# Patient Record
Sex: MALE | Race: WHITE | NOT HISPANIC OR LATINO | URBAN - METROPOLITAN AREA
[De-identification: names, ages, dates, MRNs, and addresses within clinical notes are randomized per-mention and may not be internally consistent; named-entity substitution may affect disease eponyms.]

---
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
## 2023-01-30 NOTE — ED PEDIATRIC NURSE NOTE - DISCHARGE DATE/TIME
22-Nov-2020 10:38 Erythromycin Pregnancy And Lactation Text: This medication is Pregnancy Category B and is considered safe during pregnancy. It is also excreted in breast milk.

## 2023-08-15 ENCOUNTER — OUTPATIENT (OUTPATIENT)
Dept: OUTPATIENT SERVICES | Age: 18
LOS: 1 days | Discharge: HOME | End: 2023-08-15

## 2023-08-16 ENCOUNTER — RESULT REVIEW (OUTPATIENT)
Age: 18
End: 2023-08-16

## 2023-08-16 ENCOUNTER — APPOINTMENT (OUTPATIENT)
Dept: PEDIATRIC HEMATOLOGY/ONCOLOGY | Facility: CLINIC | Age: 18
End: 2023-08-16
Payer: COMMERCIAL

## 2023-08-16 VITALS
OXYGEN SATURATION: 100 % | RESPIRATION RATE: 20 BRPM | TEMPERATURE: 98.24 F | BODY MASS INDEX: 23.69 KG/M2 | WEIGHT: 176.81 LBS | HEIGHT: 72.48 IN | SYSTOLIC BLOOD PRESSURE: 119 MMHG | HEART RATE: 80 BPM | DIASTOLIC BLOOD PRESSURE: 77 MMHG

## 2023-08-16 DIAGNOSIS — Z82.3 FAMILY HISTORY OF STROKE: ICD-10-CM

## 2023-08-16 DIAGNOSIS — Z86.19 PERSONAL HISTORY OF OTHER INFECTIOUS AND PARASITIC DISEASES: ICD-10-CM

## 2023-08-16 DIAGNOSIS — M86.152: ICD-10-CM

## 2023-08-16 DIAGNOSIS — I87.002 POSTTHROMBOTIC SYNDROME W/OUT COMPLICATIONS OF LEFT LOWER EXTREMITY: ICD-10-CM

## 2023-08-16 DIAGNOSIS — A40.0: ICD-10-CM

## 2023-08-16 DIAGNOSIS — Z86.718 PERSONAL HISTORY OF OTHER VENOUS THROMBOSIS AND EMBOLISM: ICD-10-CM

## 2023-08-16 LAB
BASOPHILS # BLD AUTO: 0.11 K/UL — SIGNIFICANT CHANGE UP (ref 0–0.2)
BASOPHILS NFR BLD AUTO: 2.1 % — HIGH (ref 0–2)
D DIMER BLD IA.RAPID-MCNC: <150 NG/ML DDU — SIGNIFICANT CHANGE UP
EOSINOPHIL # BLD AUTO: 0.34 K/UL — SIGNIFICANT CHANGE UP (ref 0–0.5)
EOSINOPHIL NFR BLD AUTO: 6.6 % — HIGH (ref 0–6)
FIBRINOGEN PPP-MCNC: 264 MG/DL — SIGNIFICANT CHANGE UP (ref 200–465)
HCT VFR BLD CALC: 44.6 % — SIGNIFICANT CHANGE UP (ref 39–50)
HGB BLD-MCNC: 15.9 G/DL — SIGNIFICANT CHANGE UP (ref 13–17)
IANC: 2.76 K/UL — SIGNIFICANT CHANGE UP (ref 1.8–7.4)
IMM GRANULOCYTES NFR BLD AUTO: 0.4 % — SIGNIFICANT CHANGE UP (ref 0–0.9)
LYMPHOCYTES # BLD AUTO: 1.4 K/UL — SIGNIFICANT CHANGE UP (ref 1–3.3)
LYMPHOCYTES # BLD AUTO: 27 % — SIGNIFICANT CHANGE UP (ref 13–44)
MCHC RBC-ENTMCNC: 30.7 PG — SIGNIFICANT CHANGE UP (ref 27–34)
MCHC RBC-ENTMCNC: 35.7 GM/DL — SIGNIFICANT CHANGE UP (ref 32–36)
MCV RBC AUTO: 86.1 FL — SIGNIFICANT CHANGE UP (ref 80–100)
MONOCYTES # BLD AUTO: 0.56 K/UL — SIGNIFICANT CHANGE UP (ref 0–0.9)
MONOCYTES NFR BLD AUTO: 10.8 % — SIGNIFICANT CHANGE UP (ref 2–14)
NEUTROPHILS # BLD AUTO: 2.76 K/UL — SIGNIFICANT CHANGE UP (ref 1.8–7.4)
NEUTROPHILS NFR BLD AUTO: 53.1 % — SIGNIFICANT CHANGE UP (ref 43–77)
NRBC # BLD: 0 /100 WBCS — SIGNIFICANT CHANGE UP (ref 0–0)
PLATELET # BLD AUTO: 215 K/UL — SIGNIFICANT CHANGE UP (ref 150–400)
PMV BLD: 9.9 FL — SIGNIFICANT CHANGE UP (ref 7–13)
RBC # BLD: 5.18 M/UL — SIGNIFICANT CHANGE UP (ref 4.2–5.8)
RBC # BLD: 5.18 M/UL — SIGNIFICANT CHANGE UP (ref 4.2–5.8)
RBC # FLD: 11.7 % — SIGNIFICANT CHANGE UP (ref 10.3–14.5)
RETICS #: 67.3 K/UL — SIGNIFICANT CHANGE UP (ref 25–125)
RETICS/RBC NFR: 1.3 % — SIGNIFICANT CHANGE UP (ref 0.5–2.5)
WBC # BLD: 5.19 K/UL — SIGNIFICANT CHANGE UP (ref 3.8–10.5)
WBC # FLD AUTO: 5.19 K/UL — SIGNIFICANT CHANGE UP (ref 3.8–10.5)

## 2023-08-16 PROCEDURE — 99213 OFFICE O/P EST LOW 20 MIN: CPT

## 2023-08-17 DIAGNOSIS — Z86.19 PERSONAL HISTORY OF OTHER INFECTIOUS AND PARASITIC DISEASES: ICD-10-CM

## 2023-08-17 DIAGNOSIS — I82.409 ACUTE EMBOLISM AND THROMBOSIS OF UNSPECIFIED DEEP VEINS OF UNSPECIFIED LOWER EXTREMITY: ICD-10-CM

## 2023-08-17 DIAGNOSIS — D68.9 COAGULATION DEFECT, UNSPECIFIED: ICD-10-CM

## 2023-08-17 DIAGNOSIS — Z82.3 FAMILY HISTORY OF STROKE: ICD-10-CM

## 2023-08-17 DIAGNOSIS — Z86.718 PERSONAL HISTORY OF OTHER VENOUS THROMBOSIS AND EMBOLISM: ICD-10-CM

## 2023-08-17 DIAGNOSIS — I82.402 ACUTE EMBOLISM AND THROMBOSIS OF UNSPECIFIED DEEP VEINS OF LEFT LOWER EXTREMITY: ICD-10-CM

## 2023-08-17 DIAGNOSIS — Z87.39 PERSONAL HISTORY OF OTHER DISEASES OF THE MUSCULOSKELETAL SYSTEM AND CONNECTIVE TISSUE: ICD-10-CM

## 2023-08-18 PROBLEM — Z86.718 HISTORY OF DEEP VEIN THROMBOSIS: Status: RESOLVED | Noted: 2017-08-02 | Resolved: 2023-08-18

## 2023-08-18 PROBLEM — I87.002 POST-THROMBOTIC SYNDROME OF LEFT LOWER EXTREMITY: Status: ACTIVE | Noted: 2020-11-20

## 2023-08-18 NOTE — DISCUSSION/SUMMARY
[FreeTextEntry1] : Telephone parent as message left for call back with labs and medication concerns.  PT/INR completed today INR 1.3 L (target 2-3); AntiXa 0.67. Taking 40mg weekly of Warfain and Enoxaparin 70mg twice daily subcutaneous. Needs refills for injectables--Renewed. Advised to call office [phone] if prior authorization is required or any pharmaceutical questions arise.  Warfarin increased --boost 15mg tonight only then give 10mg daily for 3 days and 5 mg daily for 4 days--total 50mg weekly. Repeat INR on 5/25/16 11am scheduled labs only to include CBCDretic, CRP, CMP, ESR. Informed of change in antibiotic form to oral administration of Cephalexin 1500mg every 8 hrs--reminded to administer Warfarin on empty stomach to minimize drug interaction with meds/food.  Report any unusual bleeding/bruising immediately.\par

## 2023-08-18 NOTE — REVIEW OF SYSTEMS
[Anxiety] : anxiety [Negative] : Allergic/Immunologic [Immunizations are up to date by report] : Immunizations are up to date by report [Ecchymoses] : no ecchymoses [Epistaxis] : no epistaxis [Pallor] : no pallor [Anemia] : no anemia [Dyspnea] : no dyspnea [Wheezing] : no wheezing [Joint Swelling] : no joint swelling [Joint Stiffness] : no joint stiffness [Myalgia] : no myalgia [Bone Pain] : no bone pain [FreeTextEntry1] : H/O TSS and osteomyelitis [de-identified] : history of left leg swelling and pain resolving

## 2023-08-18 NOTE — CONSULT LETTER
[Dear  ___] : Dear  [unfilled], [Consult Letter:] : I had the pleasure of evaluating your patient, [unfilled]. [Please see my note below.] : Please see my note below. [Consult Closing:] : Thank you very much for allowing me to participate in the care of this patient.  If you have any questions, please do not hesitate to contact me. [Sincerely,] : Sincerely, [___] : [unfilled] [FreeTextEntry2] : Monie Henson MD\par  [address] [facility]\[place] [address]\par  [phone] [FreeTextEntry3] : MELANIE Head\par  Pediatric Nurse Practitioner\par  Pediatric Hematology Oncology

## 2023-08-18 NOTE — RESULTS/DATA
[FreeTextEntry1] : EXAM:  US DPLX LWR EXT VEINS LTD LT  \par  \par  \par  PROCEDURE DATE:  Nov 26 2017 \par  \par  \par  \par  INTERPRETATION:  CLINICAL INFORMATION: Left leg swelling and pain.\par  \par  COMPARISON: None available.\par  \par  TECHNIQUE: Duplex sonography of the LEFT LOWER extremity with color and \par  spectral Doppler, with and without compression.  \par  \par  FINDINGS:\par  \par  There is normal compressibility of the left common femoral and proximal \par  and mid femoral vein. There is thrombus from the left distal femoral vein \par  down to level of the calf veins (peroneal, posterior tibial and \par  gastrocnemius veins), consistent with an acute DVT.\par  \par  The contralateral common femoral vein is patent.\par  \par  Doppler examination shows normal spontaneous and phasic flow in the left \par  common femoral and proximal and mid femoral vein.\par  \par  IMPRESSION: \par  \par  Acute DVT from the calf veins through distal femoral vein, below and \par  above the knee.\par  \par  The findings were discussed with Dr. Barrera at 8:10 pm on 11/26/17 with [facility].\par  \par  \par  \par  \par  \par  \par  ALICIA BELTRAN M.D., RADIOLOGY RESIDENT\par  This document has been electronically signed.\par  DANIELLE GRANGER M.D, ATTENDING RADIOLOGIST\par  This document has been electronically signed. Nov 26 2017  8:11PM\par  \par  \par  \par  HPI:\par  Patient is a 11 yo male with PMHx significant for DVT and sepsis (4/2016), that presents today with left leg pain x1 week. 7 days prior to admission, patient states that he began to notice pain in the back of his right thigh and calf while playing basketball in gym class. The pain resolved without any medication and then returned 3 days PTA and has persisted every day up until admission. The pain became constant and was described as a "pulsating" pain. Worse with walking and improved with rest. Pain was rated as 5-6/10 when walking. On morning of admission, mom noted that the left leg appeared more swollen than the right leg prompting a call to his PMD who recommended the patient come to the ED. Patient denies recent fevers, chest pain, shortness of breath, pleuritic chest pain, rhinorrhea, abdominal pain, nausea, vomiting or diarrhea. No recent travel. Not a smoker. \par  Of note, patient was admitted from 4/2/16-4/19/16 requiring a PICU stay of 9 days for GAS bacteremia, osteomyelitis and a DVT of  left lower limb external iliac , common femoral, popliteal veins. Patient has been following with hematology (Dr. French). He was initially started on lovenox then switched to Coumadin. Anticoagulation discontinued on 10/2016. Mother denies family history of bleeding disorders. \par  ED Course: Patient had xrays of left tibia, knee and femur as well as a left lower extremity venous Doppler. CBC, Coags and T&S were sent. Labwork and xrays were unremarkable. Ultrasound revealed a thrombus from the left distal femoral vein down to level of the calf veins (peroneal, posterior tibial and gastrocnemius veins), consistent with an acute DVT. Hematology consulted and patient received a loading dose of heparin, was started on a  heparin drip and sent up to the floor. Blood culture also sent given patient's history of bacteremia and mom's concern for a similar episode. (27 Nov 2017 03:51)\par  \par  \par  PAST MEDICAL & SURGICAL HISTORY:\par  Acute deep vein thrombosis (DVT) of femoral vein of left lower extremity\par  No significant past surgical history\par  \par  \par  \par  SOCIAL HISTORY:\par  Lives with mother and 9yo brother\par  Non-smoker\par  Takes Vitamin C occasionally\par  \par  FAMILY HISTORY:\par  Mother had 1 miscarriage\par  Maternal grandfather with stroke at age 66\par  \par  Allergies\par  \par  No Known Allergies\par  \par  Intolerances\par  \par  \par  MEDICATIONS  (STANDING):\par  heparin   Infusion -  Peds 15.2 Unit(s)/kG/Hr (12.084 mL/Hr) IV Continuous <Continuous>\par  heparin IV Intermittent (Loading Dose) - Peds 4000 Unit(s) IV Bolus once\par  \par  MEDICATIONS  (PRN):\par  \par  \par  REVIEW OF SYSTEMS\par  All review of systems negative, except for those marked:\par  Constitutional		Denies  fever, chills, fatigue\par  Skin			Denies rash, petechiae, \par  Eyes			Normal (no vision changes, photophobia)\par  ENT			Normal (no ear pain, discharge)\par  Hematology		DVT LLE\par  Respiratory		Denies dyspnea, shortness of breath\par  Cardiovascular		Normal (no murmur, chest pain/pressure)\par  Gastrointestinal		Normal (no abdominal pain, nausea, emesis)\par  Musculoskeletal		Swollen L calf, no pain at this time, + parasthesias\par  Endocrine		Normal (no polydipsia, polyuria)\par  Neurologic		Denies headache\par  \par  \par  \par  Daily Height/Length in cm: 182.5 (27 Nov 2017 01:14)  \par  Daily Weight in Gm: 21871 (27 Nov 2017 01:14)\par  Vital Signs Last 24 Hrs\par  T(C): 36.7 (27 Nov 2017 09:35), Max: 37.4 (26 Nov 2017 22:26)\par  T(F): 98 (27 Nov 2017 09:35), Max: 99.3 (26 Nov 2017 22:26)\par  HR: 72 (27 Nov 2017 09:35) (62 - 89)\par  BP: 121/60 (27 Nov 2017 09:35) (109/57 - 121/60)\par  BP(mean): --\par  RR: 20 (27 Nov 2017 09:35) (16 - 20)\par  SpO2: 100% (27 Nov 2017 09:35) (97% - 100%)\par  Pain Score:     , Scale:\par  Lansky/Karnofsky Score:\par  \par  PHYSICAL EXAM\par  All physical exam findings normal, except those marked:\par  Constitutional:	Normal: well appearing, in no apparent distress\par  Eyes		Normal: no conjunctival injection, symmetric gaze\par  ENT:		Normal: mucus membranes moist, no mouth sores or mucosal bleeding, normal .\par  .		dentition, symmetric facies.\par  Neck		Normal: no thyromegaly or masses appreciated\par  Cardiovascular	Normal: regular rate, normal S1, S2, no murmurs, rubs or gallops\par  Respiratory	Normal: clear to auscultation bilaterally, no wheezing\par  Abdominal	Normal: normoactive bowel sounds, soft, NT, no hepatosplenomegaly, no \par  Lymphatic	Normal: no adenopathy appreciated\par  Extremities	L calf swollen without erythema, no TTP, pedal pulses brisk b/l\par  Skin		Pen marks to calfs b/l\par  Neurologic	Normal: no focal deficits, \par  Psychiatric	Normal: affect appropriate\par  Musculoskeletal		Normal: full range of motion and no deformities \par  \par  \par  Lab Results\par  \par                                          16.6                  Neurophils% (auto):   64.4 \par  (11-26 @ 19:00):    9.64 )-----------(187          Lymphocytes% (auto):  21.3 \par                                         47.7                   Eosinphils% (auto):   4.7  \par  \par  Manual%: Neutrophils x    ; Lymphocytes x    ; Eosinophils x    ; Bands%: x    ; Blasts x    \par  \par  \par  .		Differential:	[] Automated		[] Manual\par  \par  \par  \par  \par  PT/INR - ( 26 Nov 2017 18:56 )   PT: 11.4 SEC;   INR: 1.02   \par  \par  PTT - ( 27 Nov 2017 08:35 )  PTT:48.0 SEC\par  \par  \par  IMAGING STUDIES:\par  \par  \par  EXAM:  US DPLX LWR EXT VEINS LTD LT  \par  \par  \par  PROCEDURE DATE:  Nov 26 2017 \par  \par  \par  \par  INTERPRETATION:  CLINICAL INFORMATION: Left leg swelling and pain.\par  \par  COMPARISON: None available.\par  \par  TECHNIQUE: Duplex sonography of the LEFT LOWER extremity with color and \par  spectral Doppler, with and without compression.  \par  \par  FINDINGS:\par  \par  There is normal compressibility of the left common femoral and proximal \par  and mid femoral vein. There is thrombus from the left distal femoral vein \par  down to level of the calf veins (peroneal, posterior tibial and \par  gastrocnemius veins), consistent with an acute DVT.\par  \par  The contralateral common femoral vein is patent.\par  \par  Doppler examination shows normal spontaneous and phasic flow in the left \par  common femoral and proximal and mid femoral vein.\par  \par  IMPRESSION: \par  \par  Acute DVT from the calf veins through distal femoral vein, below and \par  above the knee.\par  \par  The findings were discussed with Dr. Barrera at 8:10 pm on 11/26/17 with [facility].  \par  \par  \par  \par  \par  Assessment and Recommendation: \par   Assessment	\par  Nelson is a 12 year old male who presents with a recurrence of L extremity DVT. He has a history in April 2016 of GAS bacteremia and L distal femoral osteomyelitis complicated by a DVT of the left lower limb external iliac, common femoral and popliteal veins. He completed 3 months of Lovenox followed by 3 months of coumadin (switched over per patient preference). He did have a history of a positive lupus anticoagulant that resolved.\par  \par  Patient has had pain in his L leg 7 days prior to admission which he attributed to basketball. He denies any trauma, had traveled by car for 2.5 hours about 2 weeks ago to go hiking, but has not been on any prolonged trips or plane rides. Mother has a history of 1 miscarriage and maternal grandfather had a stroke at 66 years of age.\par  \par  It is currently unclear the etiology of this ipsilateral L leg DVT, but a repeat lupus anticoagulant may be prudent due to his previous history. Dehydration in the setting of playing basketball could have been an inciting factor.\par  \par  Patient was loaded with heparin 75 units/kg last night and started on an infusion at 13.8 units/kg (unclear reason for this infusion rate as the general maintenance dose is 20 units/kg after a loading dose). Patient has been subtherapeutic and adjustments should be made per the chart below.\par  \par  Discussed with IR possibility of site-directed tPA but as the clot is not extensive and is not extending in the iliac, there is no current role for therapeutic intervention per IR\par  \par  \par  \par  UNFRACTIONATED HEPARIN THERAPY\par  \par  APTT (sec)	Bolus (U/kg)	Hold (min)	Rate change (%)	repeat aPTT (hr)\par  < 50	50	0	+ 10	4\par  50-59	0	0	+ 10	4\par  60-85	0	0	0	next day\par  86-95	0	0	- 10	4\par  	0	30	- 10	4\par  > 120	0	60	- 15	4\par  \par  Goal PTT 60-85\par  \par  \par  \par  \par  \par  \par  LOVENOX-ADJUSTMENT\par  \par  \par  Anti-FXa level	Hold next dose?	Dose change?	Repeat anti-FXa level?\par  < 0.35 units/ml	No	Increase by 25%	3-4 hours post 3 doses\par  0.35 - 0.49 units/ml	No	Increase by 10%	3-4 hours post 3 doses\par  0.5 - 1.0 units/ml	No	No	Weekly, 3-4 hours post dose\par  1.1 - 1.5 units/ml	No	Decrease by 20%	3-4 hours post 3 doses\par  1.6 - 2.0 units/ml	No	Decrease by 30%	3-4 hours post 3 doses\par  \par  > 2.0	For these patients, all further doses should be held.  The anti-Xa level is measured every 12 hours until it is < 0.5 units/ml.  LMWH can then be started at a dose 40% less than was originally prescribed   \par  \par  \par  \par  \par  IM injections and arterial sticks are to be AVOIDED\par  Do NOT administer aspirin or NSAIDs\par  NO contact sports\par  When on warfarin: check medication interactions prior to introducing new medications. NO leafy green vegetables\par  \par  \par   Problem/Recommendation - 1:\par  Problem: Acute deep vein thrombosis (DVT) of femoral vein of left lower extremity. Recommendation: - Repeat Doppler (discuss with vascular surgery)\par  \par  - Continue to adjust heparin drip per chart above\par    -- Next PPT due at 3pm, please also obtain a BMP\par  \par  - At 8pm: discontinue heparin drip and administer Lovenox 80 mg/kg (1 mg/kg) SQ q12h\par    -- Check anti-Xa LMWH (not unfractionated) 3-4 hours after 3rd SQ dose *timing critical* - adjust per chart above\par  \par  - Begin warfarin 10mg PO once tonight (further doses to be written based on daily INR)\par  \par  - Obtain lupus anticoagulant panel with next blood draw\par  - Daily AM PT/PTT and CBC/d\par  - L calf measurements daily.\par  \par  Attending Attestation: \par  12 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 In 2016, he developed a DVT in the setting of sepsis. He was treated for 6 months with anticoagulation and repeat Doppler in October 2016 showed compete resolution of the clot. His hypercoagulable work-up was negative. This time he presents with an unprovoked DVT. The etiology is not clear at this point. Both Nelson and his mother state that they would prefer treatment with Coumadin. Will switch to Lovenox today and start Coumadin. \par  \par  I was physically present for the key portions of the evaluation and management (E/M) service provided.  I agree with the above history, physical, and plan which I have reviewed and edited where appropriate.\par  \par  \par  Electronic Signatures:\par  Romina Fountain)  (Signed 28-Nov-2017 13:02)\par  	Authored: Consult Note, Referral/Consultation, Subjective and Objective, Assessment and Recommendation\par  Ilana Carrasquillo)  (Signed 27-Nov-2017 15:53)\par  	Authored: Attending Attestation\par  	Co-Signer: Consult Note, Referral/Consultation, Subjective and Objective, Assessment and Recommendation\par  \par  \par  Last Updated: 28-Nov-2017 13:02 by Romina Fountain)\par  \par  \par  \par  \par  EXAM:  MR LWR EXT NON JOINT WAWIC LT  \par  \par  \par  PROCEDURE DATE:  Nov 30 2017 \par  \par  \par  \par  INTERPRETATION:  Indication: History of recurrent lower leg extremity \par  venous thrombi on anticoagulation therapy, evaluate lower extremity \par  venous system\par  \par  7.5 is of Gadavist was injected intravenously and imaging of the pelvis \par  and upper lower extremities was performed. The aorta, iliac arteries and \par  superficial femoral arteries appear within normal limits bilaterally. Due \par  to technical issues, the venous system was not imaged on this study. \par  Repeat examination for evaluation of the venous system is therefore \par  recommended.\par  \par  Impression: Nondiagnostic study of the venous system. The arterial system \par  is within normal limits.\par  \par  JUDITH CHAPPELL M.D., ATTENDING RADIOLOGIST\par  This document has been electronically signed. Nov 30 2017  3:58PM\par    \par  \par  \par    \par  \par    \par  \par

## 2023-08-18 NOTE — HISTORY OF PRESENT ILLNESS
[de-identified] : 12/5/17 Interval follow up s/p recent hospitalization for second new DVT of left lower extremity. Lovenox 80mg subcutaneous every 12 hrs; no missed doses; last dose 7am today. Needs refill prescription. Denies any active minor or major bleeding episodes; small ecchymosis at injection sites. Reports "heaviness and mild tingling"of left lower extremity when standing or increase exercise. Ambulating without change in gait. No complaints of chest pain or breathing difficulty. Noted size discrepancy of calf from unaffected leg; improved from initial presentation. No distended veins.  Patient has been home since discharge from hospital until evaluation with visit today--needs letter for return to school with activity limitations as needed for bleeding precautions.  Discussion and questions addressed regarding possible triggers for new thrombosis; long car travel with extended periods of immobility with previous history of thrombosis may have increased risk for DVT.   1/30/18 Interval follow up with discussion of bridging therapy from Lovenox to Warfarin. Lovenox 80mg q12hr subq injections given; no missed doses; last dose 7am today. Reports ecchymosis and hematomas at injections sites; concern for discomfort. Preference to begin oral VKA anticoagulant. Mother does not want to use DOAC Rivaroxaban at this time. Patient seen by Rheumatology Dr. 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 Diet and activity has returned to baseline; bleeding precautions maintained. Denies any left leg pain or discoloration; discrepancy in calf size observed; no edema; no chest pain or breathing issues. Denies any minor/major bleeding signs.  2/7/18 Interval follow up for medication monitoring. Bridging therapy with Lovenox to Warfarin--currently on 70mg weekly of Warfarin. Reported boost dose on 1st day given as directed; missed dose on 2nd day; 15mg given on day#3 rather than 10mg as directed; then continued with 10mg daily using calendar for reference. Reports localized bruising to arms at injection sites with hematomas; did not tolerate use of abdomen. Denies any minor/major active bleeding signs. Diet modified; limiting Vit K foods/greens.  3/14/18 Interval follow up for medication monitoring. Remains on Warfarin total weekly dose 62.5mg divided 10mg daily x 4 days; 7.5mg daily x 3 days; no missed doses. Dietary modifications maintained; herbal tea given with regularity every other day. Denies any bleeding minor/major signs. Denies any leg pain or increased swelling; no chest pain or breathing issues. 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   4/18/18 Interval follow up for medication monitoring. Remains on Warfarin total weekly dose 62.5mg divided 10mg daily x 4 days; 7.5mg daily x 3 days; no missed doses. Dietary modifications maintained; herbal tea given with regularity every other day. Denies any bleeding minor/major signs. Denies any leg pain or increased swelling; no chest pain or breathing issues. Reports playing basketball; noncompetitive with friends positioned for rebound. Denies any trauma. Needs school form completed for health office.   6/8/18 Interval follow up for medication monitoring. H&P completed by Dr. French on 5/30/18 with recommendations for Lovenox use during school trip; currently bridging back to Warfarin with reduced INR threshold (1.5-2.2) recommended by physician since patient is active in recreational sports. Denies any bleeding events. Currently on Lovenox 80mg once daily; Warfarin 62.5mg weekly divided 7.5mg daily x 3 days; 10mg daily x 4 days. Reports missing single dose x1. No illness; no pain of affected lower extremity.  7/25/18 Interval follow up for medication monitoring. Remains on Warfarin 62.5mg weekly divided 7.5mg daily x 3 days; 10mg daily x 4 days. Reports missing approx 1 dose while on trip with family. No active minor/major bleeding. Denies any illness. No complaints of pain of lower left extremity; no increased swelling or discoloration; no chest pain or breathing issues.   8/17/18 Interval follow up for medication monitoring. Required a boost last visit for low INR most likely associated with missed dose. Currently on 62.5mg weekly Warfarin divided  7.5mg daily x 3 days; 10mg daily x 4 days. Missed one dose Saturday while [facility].  Denies any bleeding adverse events. Left lower leg mild swelling--no changes; no pain or distended veins.   12/26/18: Nelson is doing well, no bleeding from any site, has missed 2 doses in the last 2 weeks; was doing circuit training in school and noted left LL swelling to improve. No pain, skin changes or heaviness in the limb. On warfarin 10 mg x 4 and 7.5 mg x 2   08/28/19: Nelson is here for follow up; on warfarin 10 mg x 4 and 7.5 mg x 2; no bleeding from any site; has been sick x 4 days with fever - upto 101 takes Tylenol 1-2 times/ day and localized swelling in rt. neck which was visible from a distance but over the last 2  days has reduced in size - saw PCP who did a throat culture - results pending; started on Amoxicillin for suspected strep throat. Also reports weight loss of 12 lbs over the last 2 months; claims eats only 2 meals / day during the summer since he wakes up around noon. no joint pain, swelling, bleeding from any sites; no rash , no night sweats, has occasional chills; no recent travel / contact with animals;   Mother took Nelson for a second opinion to see Dr Manohar Romano at [facility] who repeated his thrombophilia work up which was [place] [facility] and was normal again; mother mentioned that he could not opine why Nelson developed the second clot and recommended stopping the Coumadin and using zelda travel anticoagulant prophylaxis ; mother reports father with DVT rt. LL after he tore his tendo Achilles and was immobile in a boot x 1.5 weeks currently taking Xarelto   02/19/20: Nelson is doing well; left calf bigger than rt but no c/o pain, increased swelling or interfering with gait, heaviness end of day; continues on warfarin INR range 1.8-2.5 ; missed a dose this week and misses doses off and on ; no bleeding from any site; Sophomore in [employer] - enjoys it doing well; school trip to [place] for 10 days planned in [place] - mother concerned about anticoagulation ; feels it will be canceled because of corona virus epidemic   11/18/20: Nelson has been well since last visit in Feb 2020; has d/vitor warfarin per patient and mother's choice and has not noted and swelling, pain or other symptoms in his extremities or chest pain with breathing difficulty; currently doing remote classes - 10th grade ; goes to the Gym 3 times/ week since eh has nothing else to do and lifting weights, bench and leg presses; has not played basketball since pandemic started; looking into colleges  08/11/21:  Nelson has been well since last visit in Nov 2020; has d/ivtor warfarin per patient and mother's choice and has not noted and swelling, pain or other symptoms in his extremities or chest pain with breathing difficulty; starts Grade 12 in the Fall and going through [employer] apps ; goes to the Gym 3 times/ week since he has nothing else to do and lifting weights, bench and leg presses; has not played basketball since pandemic started; had COVID last year , everyone in the family had symptoms, wants to get antibody testing   07/27/22: Nelson has a h/o 2 DVTs in the lt. LL - first in 2016 which was provoked by osteomyelitis in the setting of toxic shock syndrome and CVL , treated x 6 months until clot resolution also had a transient LA, was off anticoagulation x 1 yr and developed another DVT in the same limb ? provoked by long car ride to [facility]; treated x 2 yrs with anticoagulation after which patient and mother decide dto d/c anticoagulation despite discussing risks for recurrence; has been doing well with no recurrence; phasically active - goes to the Gym, lifts weights, eats healthy; no c/o pain or swelling in lt. LL or feeling of heaviness in limb after a long day on his feet; starts College [employer] in the Fall majoring in , excited to start college; sexually active with 1 partner uses protection ; drinks alcohol on occasion  once in a couple of months but does not get inebriated , non smoker, no IV drug use   08/16/23: Nelson ahs a h/o 2 DVTs with triggers of infection - osteomyelitis, s/p anticoagulation x 6 mnths given residual clot, recurrence after 1 yr following a long car ride to [facility] likely trigger s/p anticoagulation for 1 yr and then d/vitor per patient and parent choice ; no significant thrombophilia; now off anticoagulation for > 3 yrs with no recurrence; doing well ; finished freshman yr at [employer] -[employer] major; 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

## 2023-08-18 NOTE — REASON FOR VISIT
[Other ___] : [unfilled] visit for  [Coagulopathy] : coagulopathy [Deep Vein Thrombosis] : deep vein thrombosis [Patient] : patient [Mother] : mother [Medical Records] : medical records [Follow-Up Visit] : a follow-up visit for

## 2023-08-18 NOTE — PHYSICAL EXAM
[Thin] : thin [No focal deficits] : no focal deficits [Normal] : affect appropriate [100: Fully active, normal.] : 100: Fully active, normal. [Pallor] : no pallor [de-identified] : Tall [de-identified] : Right calf-43 cm Left calf 44.5 cm; Right thigh 50.5 cm left thigh 51.0 cm; no dilated veins or skin changes, no edema

## 2023-08-18 NOTE — FAMILY HISTORY
[White] : White [Healthy] : healthy [Full] : full brother [FreeTextEntry2] : Polish [de-identified] : Polish

## 2024-03-21 NOTE — ED PEDIATRIC TRIAGE NOTE - TEMP(CELSIUS)
Monitor: The problem is unchanged.  Evaluation: No labs/tests required today.  Assessment/Treatment:  Continue current treatment/monitoring regimen.     36.3
